# Patient Record
(demographics unavailable — no encounter records)

---

## 2025-04-01 NOTE — ADDENDUM
[FreeTextEntry1] : I, Anita Hills NP, acted as scribe for Dr. Calderon Rojo for this patient encounter

## 2025-04-01 NOTE — REASON FOR VISIT
[Follow-up] : a follow-up of an existing diagnosis [Language Line ] : provided by [employer]   [Time Spent: ____ minutes] : Total time spent using  services: [unfilled] minutes. The patient's primary language is not English thus required  services. [FreeTextEntry1] : epigastric pain, nausea [Interpreters_IDNumber] : [id number] [Interpreters_FullName] : [email or web address] [TWNoteComboBox1] : Citizen of the [place]

## 2025-04-01 NOTE — PHYSICAL EXAM
[Alert] : alert [Normal Voice/Communication] : normal voice/communication [Healthy Appearing] : healthy appearing [No Acute Distress] : no acute distress [Sclera] : the sclera and conjunctiva were normal [Hearing Threshold Finger Rub Not [place]] : hearing was normal [Normal Lips/Gums] : the lips and gums were normal [Oropharynx] : the oropharynx was normal [Normal Appearance] : the appearance of the neck was normal [No Neck Mass] : no neck mass was observed [No Respiratory Distress] : no respiratory distress [No Acc Muscle Use] : no accessory muscle use [Respiration, Rhythm And Depth] : normal respiratory rhythm and effort [Auscultation Breath Sounds / Voice Sounds] : lungs were clear to auscultation bilaterally [Heart Rate And Rhythm] : heart rate was normal and rhythm regular [Normal S1, S2] : normal S1 and S2 [Murmurs] : no murmurs [Bowel Sounds] : normal bowel sounds [Abdomen Tenderness] : non-tender [No Masses] : no abdominal mass palpated [Abdomen Soft] : soft [] : no hepatosplenomegaly [Oriented To Time, Place, And Person] : oriented to person, place, and time

## 2025-04-01 NOTE — HISTORY OF PRESENT ILLNESS
[FreeTextEntry1] : ANNITA PAREDES is a 48 year old female with PMH of HTN, HLD, C/S, presenting today for evaluation of upper GI complaints. She has a longstanding history of chronic GERD for many years that has been well managed with Omeprazole 40 mg QD and Dicyclomine 20 mg PRN up until a few months ago. She reports that for the last 2-3 months she has been experiencing epigastric pain that radiates around to her back at least 2-3 times a week. It is not triggered by any particular food intake. It can last for hours to all day. There is occasional nausea but no vomiting. Appetite is good. Her last EGD was in 2020 and revealed erosive gastritis. She also has a history of H pylori that was treated in the past.   Colonoscopy in 2024 was normal. A 10 year follow up was recommended. Bowel movements are regular and without blood.

## 2025-04-01 NOTE — ASSESSMENT
[FreeTextEntry1] : 48 year old female with longstanding history of chronic GERD now with an exacerbation for the last 2-3 months. History of erosive gastritis in 2020. Will schedule upper endoscopy at [facility] for further evaluation. I have discussed the indications (including but not limited to ruling out Fields's esophagus, AVM's, H. pylori, and malignancies), benefits, risks (including but not limited to reaction to the anesthesia, infection, bleeding, missed lesions, and perforation), and alternatives to an endoscopy. The patient understands all options and has agreed to endoscopy and is medically optimized for the planned procedure.   Continue Omeprazole 40 mg QD Continue Dicyclomine 20 mg QID PRN Start Famotidine 40 mg QD GERD diet reviewed  Obtain labs from PCP.  I evaluated this patient with my ACP and agree with the above assessment and management plan for repeat upper endoscopy for worsening reflux symptoms which will be done @ [facility] 2/2 her obesity and BMI.

## 2025-04-11 NOTE — ASSESSMENT
[FreeTextEntry1] : This is a 48-year-old woman with a long history of chronic headache. Imaging has been negative.  I had started her on Topamax for prophylaxis. I had titrated her up to 50 mg at bedtime. Her headaches have improved. I will continue the current regimen.  I will refer her to physical therapy for the neck.  I will see her back in 6 months.

## 2025-04-11 NOTE — DATA REVIEWED
[de-identified] : Brain MRI was performed on 4/19/21. The study was unremarkable. 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  [de-identified] : CT scan of the head was performed on 9/9/18. The study was unremarkable. (There was a persistent cavum septum pellucidum and cavum vergae).

## 2025-04-11 NOTE — PHYSICAL EXAM
[General Appearance - Alert] : alert [General Appearance - In No Acute Distress] : in no acute distress [Oriented To Time, Place, And Person] : oriented to person, place, and time [Affect] : the affect was normal [Memory Recent] : recent memory was not impaired [Memory Remote] : remote memory was not impaired [Cranial Nerves Optic (II)] : visual acuity intact bilaterally,  visual fields full to confrontation, pupils equal round and reactive to light [Cranial Nerves Oculomotor (III)] : extraocular motion intact [Cranial Nerves Trigeminal (V)] : facial sensation intact symmetrically [Cranial Nerves Facial (VII)] : face symmetrical [Cranial Nerves Vestibulocochlear (VIII)] : hearing was intact bilaterally [Cranial Nerves Glossopharyngeal (IX)] : tongue and palate midline [Cranial Nerves Accessory (XI - Cranial And Spinal)] : head turning and shoulder shrug symmetric [Cranial Nerves Hypoglossal (XII)] : there was no tongue deviation with protrusion [Motor Tone] : muscle tone was normal in all four extremities [Motor Strength] : muscle strength was normal in all four extremities [Sensation Tactile Decrease] : light touch was intact [Sensation Pain / Temperature Decrease] : pain and temperature was intact [Sensation Vibration Decrease] : vibration was intact [Abnormal Walk] : normal gait [1+] : Patella left 1+ [Optic Disc Abnormality] : the optic disc were normal in size and color [FreeTextEntry1] : The patient is morbidly obese. [Dysarthria] : no dysarthria [Aphasia] : no dysphasia/aphasia [Romberg's Sign] : Romberg's sign was negtive [Coordination - Dysmetria Impaired Finger-to-Nose Bilateral] : not present [Plantar Reflex Right Only] : normal on the right [Plantar Reflex Left Only] : normal on the left

## 2025-04-11 NOTE — HISTORY OF PRESENT ILLNESS
[FreeTextEntry1] : I saw this patient in the office today.  As you recall, the patient described headaches. This has been going on for many years.  It had become daily. It waxes and wanes in intensity. When severe it is associated with nausea and dizziness.  I had started her on Topamax for prophylaxis.  4/11/25 visit: She continues to have headaches on the order of once a week. She describes some discomfort at the back of her neck.

## 2025-04-11 NOTE — CONSULT LETTER
[Dear  ___] : Dear  [unfilled], [Courtesy Letter:] : I had the pleasure of seeing your patient, [unfilled], in my office today. [Please see my note below.] : Please see my note below. [Sincerely,] : Sincerely, [FreeTextEntry3] : Rio Cerda MD.